# Patient Record
Sex: FEMALE | Race: WHITE | Employment: OTHER | URBAN - METROPOLITAN AREA
[De-identification: names, ages, dates, MRNs, and addresses within clinical notes are randomized per-mention and may not be internally consistent; named-entity substitution may affect disease eponyms.]

---

## 2017-01-01 ENCOUNTER — TELEPHONE (OUTPATIENT)
Dept: INTERNAL MEDICINE CLINIC | Facility: CLINIC | Age: 82
End: 2017-01-01

## 2017-01-01 ENCOUNTER — HOSPITAL ENCOUNTER (OUTPATIENT)
Dept: BONE DENSITY | Facility: HOSPITAL | Age: 82
Discharge: HOME | End: 2017-01-01
Attending: INTERNAL MEDICINE
Payer: MEDICARE

## 2017-01-01 ENCOUNTER — NURSE ONLY (OUTPATIENT)
Dept: INTERNAL MEDICINE CLINIC | Facility: CLINIC | Age: 82
End: 2017-01-01

## 2017-01-01 ENCOUNTER — OFFICE VISIT (OUTPATIENT)
Dept: INTERNAL MEDICINE CLINIC | Facility: CLINIC | Age: 82
End: 2017-01-01

## 2017-01-01 VITALS
RESPIRATION RATE: 17 BRPM | SYSTOLIC BLOOD PRESSURE: 130 MMHG | WEIGHT: 169 LBS | TEMPERATURE: 98 F | HEART RATE: 69 BPM | OXYGEN SATURATION: 99 % | HEIGHT: 65 IN | DIASTOLIC BLOOD PRESSURE: 78 MMHG | BODY MASS INDEX: 28.16 KG/M2

## 2017-01-01 VITALS
RESPIRATION RATE: 17 BRPM | OXYGEN SATURATION: 100 % | BODY MASS INDEX: 28.82 KG/M2 | WEIGHT: 173 LBS | HEART RATE: 68 BPM | HEIGHT: 65 IN | DIASTOLIC BLOOD PRESSURE: 78 MMHG | SYSTOLIC BLOOD PRESSURE: 132 MMHG | TEMPERATURE: 98 F

## 2017-01-01 DIAGNOSIS — E03.9 ACQUIRED HYPOTHYROIDISM: ICD-10-CM

## 2017-01-01 DIAGNOSIS — R73.03 PREDIABETES: ICD-10-CM

## 2017-01-01 DIAGNOSIS — N18.30 CKD (CHRONIC KIDNEY DISEASE) STAGE 3, GFR 30-59 ML/MIN (HCC): ICD-10-CM

## 2017-01-01 DIAGNOSIS — R10.12 ACUTE LUQ PAIN: Primary | ICD-10-CM

## 2017-01-01 DIAGNOSIS — R10.12 ACUTE LUQ PAIN: ICD-10-CM

## 2017-01-01 DIAGNOSIS — Z78.0 MENOPAUSE: ICD-10-CM

## 2017-01-01 DIAGNOSIS — H40.9 GLAUCOMA, UNSPECIFIED GLAUCOMA TYPE, UNSPECIFIED LATERALITY: Primary | ICD-10-CM

## 2017-01-01 DIAGNOSIS — N30.01 ACUTE CYSTITIS WITH HEMATURIA: ICD-10-CM

## 2017-01-01 DIAGNOSIS — Z23 NEED FOR VACCINATION FOR STREP PNEUMONIAE: ICD-10-CM

## 2017-01-01 DIAGNOSIS — R73.03 PRE-DIABETES: Primary | ICD-10-CM

## 2017-01-01 DIAGNOSIS — H40.9 UNSPECIFIED GLAUCOMA(365.9): Primary | ICD-10-CM

## 2017-01-01 DIAGNOSIS — E03.9 ACQUIRED HYPOTHYROIDISM: Primary | ICD-10-CM

## 2017-01-01 DIAGNOSIS — H40.9 GLAUCOMA, UNSPECIFIED GLAUCOMA TYPE, UNSPECIFIED LATERALITY: ICD-10-CM

## 2017-01-01 LAB
ALBUMIN SERPL BCP-MCNC: 4 G/DL (ref 3.5–4.8)
ALBUMIN/GLOB SERPL: 1.7 {RATIO} (ref 1–2)
ALP SERPL-CCNC: 45 U/L (ref 32–100)
ALT SERPL-CCNC: 16 U/L (ref 14–54)
ANION GAP SERPL CALC-SCNC: 7 MMOL/L (ref 0–18)
AST SERPL-CCNC: 18 U/L (ref 15–41)
BASOPHILS # BLD: 0 K/UL (ref 0–0.2)
BASOPHILS NFR BLD: 1 %
BILIRUB SERPL-MCNC: 0.5 MG/DL (ref 0.3–1.2)
BUN SERPL-MCNC: 19 MG/DL (ref 8–20)
BUN/CREAT SERPL: 17.9 (ref 10–20)
CALCIUM SERPL-MCNC: 9 MG/DL (ref 8.5–10.5)
CHLORIDE SERPL-SCNC: 107 MMOL/L (ref 95–110)
CHOLEST SERPL-MCNC: 189 MG/DL (ref 110–200)
CO2 SERPL-SCNC: 25 MMOL/L (ref 22–32)
CREAT SERPL-MCNC: 1.06 MG/DL (ref 0.5–1.5)
EOSINOPHIL # BLD: 0.1 K/UL (ref 0–0.7)
EOSINOPHIL NFR BLD: 2 %
ERYTHROCYTE [DISTWIDTH] IN BLOOD BY AUTOMATED COUNT: 15.2 % (ref 11–15)
GLOBULIN PLAS-MCNC: 2.4 G/DL (ref 2.5–3.7)
GLUCOSE SERPL-MCNC: 78 MG/DL (ref 70–99)
HBA1C MFR BLD: 5.6 % (ref 4–6)
HCT VFR BLD AUTO: 37.7 % (ref 35–48)
HDLC SERPL-MCNC: 59 MG/DL
HGB BLD-MCNC: 12.4 G/DL (ref 12–16)
LDLC SERPL CALC-MCNC: 109 MG/DL (ref 0–99)
LYMPHOCYTES # BLD: 1.3 K/UL (ref 1–4)
LYMPHOCYTES NFR BLD: 34 %
MCH RBC QN AUTO: 31.9 PG (ref 27–32)
MCHC RBC AUTO-ENTMCNC: 32.9 G/DL (ref 32–37)
MCV RBC AUTO: 97 FL (ref 80–100)
MONOCYTES # BLD: 0.8 K/UL (ref 0–1)
MONOCYTES NFR BLD: 20 %
NEUTROPHILS # BLD AUTO: 1.6 K/UL (ref 1.8–7.7)
NEUTROPHILS NFR BLD: 43 %
NONHDLC SERPL-MCNC: 130 MG/DL
OSMOLALITY UR CALC.SUM OF ELEC: 289 MOSM/KG (ref 275–295)
PLATELET # BLD AUTO: 218 K/UL (ref 140–400)
PMV BLD AUTO: 9.4 FL (ref 7.4–10.3)
POTASSIUM SERPL-SCNC: 4 MMOL/L (ref 3.3–5.1)
PROT SERPL-MCNC: 6.4 G/DL (ref 5.9–8.4)
RBC # BLD AUTO: 3.88 M/UL (ref 3.7–5.4)
SODIUM SERPL-SCNC: 139 MMOL/L (ref 136–144)
TRIGL SERPL-MCNC: 103 MG/DL (ref 1–149)
TSH SERPL-ACNC: 2.05 UIU/ML (ref 0.45–5.33)
WBC # BLD AUTO: 3.8 K/UL (ref 4–11)

## 2017-01-01 PROCEDURE — 99204 OFFICE O/P NEW MOD 45 MIN: CPT | Performed by: INTERNAL MEDICINE

## 2017-01-01 PROCEDURE — G0009 ADMIN PNEUMOCOCCAL VACCINE: HCPCS | Performed by: INTERNAL MEDICINE

## 2017-01-01 PROCEDURE — 99213 OFFICE O/P EST LOW 20 MIN: CPT | Performed by: INTERNAL MEDICINE

## 2017-01-01 PROCEDURE — 84443 ASSAY THYROID STIM HORMONE: CPT | Performed by: INTERNAL MEDICINE

## 2017-01-01 PROCEDURE — 85025 COMPLETE CBC W/AUTO DIFF WBC: CPT | Performed by: INTERNAL MEDICINE

## 2017-01-01 PROCEDURE — 80053 COMPREHEN METABOLIC PANEL: CPT | Performed by: INTERNAL MEDICINE

## 2017-01-01 PROCEDURE — 90670 PCV13 VACCINE IM: CPT | Performed by: INTERNAL MEDICINE

## 2017-01-01 PROCEDURE — 83036 HEMOGLOBIN GLYCOSYLATED A1C: CPT | Performed by: INTERNAL MEDICINE

## 2017-01-01 PROCEDURE — 80061 LIPID PANEL: CPT | Performed by: INTERNAL MEDICINE

## 2017-01-01 PROCEDURE — 77080 DXA BONE DENSITY AXIAL: CPT | Performed by: INTERNAL MEDICINE

## 2017-01-01 PROCEDURE — 36415 COLL VENOUS BLD VENIPUNCTURE: CPT | Performed by: INTERNAL MEDICINE

## 2017-01-01 RX ORDER — DORZOLAMIDE HCL 20 MG/ML
SOLUTION/ DROPS OPHTHALMIC
COMMUNITY
Start: 2017-01-01 | End: 2017-01-01 | Stop reason: ALTCHOICE

## 2017-01-01 RX ORDER — LEVOTHYROXINE SODIUM 88 UG/1
88 TABLET ORAL
Qty: 90 TABLET | Refills: 3 | Status: ON HOLD | OUTPATIENT
Start: 2017-01-01 | End: 2018-01-01

## 2017-01-01 RX ORDER — LATANOPROST 50 UG/ML
1 SOLUTION/ DROPS OPHTHALMIC NIGHTLY
COMMUNITY
Start: 2017-01-01

## 2017-01-01 RX ORDER — LEVOTHYROXINE SODIUM 88 UG/1
TABLET ORAL
COMMUNITY
Start: 2017-01-01 | End: 2017-01-01

## 2017-01-01 RX ORDER — CIPROFLOXACIN 250 MG/1
250 TABLET, FILM COATED ORAL 2 TIMES DAILY
Qty: 14 TABLET | Refills: 0 | Status: SHIPPED | OUTPATIENT
Start: 2017-01-01 | End: 2017-01-01

## 2017-08-09 NOTE — PROGRESS NOTES
HPI:    Patient ID: Sharad Huang is a 80year old female. HPI   Patient is here to establish care. She had transferred to [place] from [place]. Will spend few mos here and back to [place] during winter. Retired dressmaker.  Known to have hypothy Constitutional: She is oriented to person, place, and time. She appears well-developed and well-nourished. No distress. HENT:   Head: Normocephalic.    Right Ear: External ear normal.   Left Ear: External ear normal.   Nose: Nose normal.   Mouth/Throat: O Obtain record of Bone dexa. Vitamin d  normal.     Fall precaution.     Orders Placed This Encounter      CBC W Differential W Platelet [E]      Lipid Panel [E]      Comp Metabolic Panel (14) [E]      TSH W Reflex To Free T4 [E]      HEMOGLOBIN A1C      Pn ? Wear shoes both inside and outside the house. ? Keep emergency numbers near each phone. ? Consider wearing an alarm device that will bring help in case you fall and can’t get up.

## 2017-08-10 PROBLEM — E03.9 ACQUIRED HYPOTHYROIDISM: Status: ACTIVE | Noted: 2017-01-01

## 2017-08-10 PROBLEM — H40.9 GLAUCOMA: Status: ACTIVE | Noted: 2017-01-01

## 2017-08-10 PROBLEM — E66.3 OVERWEIGHT (BMI 25.0-29.9): Status: ACTIVE | Noted: 2017-01-01

## 2017-08-10 PROBLEM — N18.30 CKD (CHRONIC KIDNEY DISEASE) STAGE 3, GFR 30-59 ML/MIN (HCC): Status: ACTIVE | Noted: 2017-01-01

## 2017-08-22 NOTE — PROGRESS NOTES
Patient presented in office today for fasting blood draw. Blood draw successful in left arm  King:  Cbc,cmp,lipid,tsh,A1C  D. O.B verified   Orders per Doctor Co

## 2017-09-05 NOTE — PROGRESS NOTES
Vaccination administered in right arm IM  Patient tolerated well no reaction at this time, no blood at injection site band aid applied.   Vaccine given:  PCV-13  Orders per Doctor Co

## 2017-10-13 NOTE — TELEPHONE ENCOUNTER
Pt request a referral to Dr. Roberto Carlos Stanford for follow up. Pt has appt on Monday 9/16.  Request 3 visitas

## 2017-11-07 NOTE — PROGRESS NOTES
HPI:    Patient ID: Uriel Crowder is a 80year old female. HPI   C/o LUQ pain for last 2 days. Pain is resolving. Recalled trying britney reach out felt she probably strained her muscle. Denies nausea, vomiting. BM normal. Appetite is good.  No cough , s and breath sounds normal.   Abdominal: Soft. Bowel sounds are normal. She exhibits no distension and no mass. There is no rebound and no guarding. Mild LUQ tenderness   Musculoskeletal: She exhibits no edema.    Neurological: She is alert and oriented to

## 2018-01-01 ENCOUNTER — ANESTHESIA (OUTPATIENT)
Dept: ENDOSCOPY | Facility: HOSPITAL | Age: 83
DRG: 375 | End: 2018-01-01
Payer: MEDICARE

## 2018-01-01 ENCOUNTER — OFFICE VISIT (OUTPATIENT)
Dept: INTERNAL MEDICINE CLINIC | Facility: CLINIC | Age: 83
End: 2018-01-01

## 2018-01-01 ENCOUNTER — TELEPHONE (OUTPATIENT)
Dept: HEMATOLOGY/ONCOLOGY | Facility: HOSPITAL | Age: 83
End: 2018-01-01

## 2018-01-01 ENCOUNTER — APPOINTMENT (OUTPATIENT)
Dept: HEMATOLOGY/ONCOLOGY | Facility: HOSPITAL | Age: 83
End: 2018-01-01
Attending: INTERNAL MEDICINE
Payer: MEDICARE

## 2018-01-01 ENCOUNTER — NURSE NAVIGATOR ENCOUNTER (OUTPATIENT)
Dept: HEMATOLOGY/ONCOLOGY | Facility: HOSPITAL | Age: 83
End: 2018-01-01

## 2018-01-01 ENCOUNTER — TELEPHONE (OUTPATIENT)
Dept: INTERNAL MEDICINE CLINIC | Facility: CLINIC | Age: 83
End: 2018-01-01

## 2018-01-01 ENCOUNTER — NURSE ONLY (OUTPATIENT)
Dept: INTERNAL MEDICINE CLINIC | Facility: CLINIC | Age: 83
End: 2018-01-01

## 2018-01-01 ENCOUNTER — HOSPITAL ENCOUNTER (OUTPATIENT)
Dept: CT IMAGING | Facility: HOSPITAL | Age: 83
Discharge: HOME | End: 2018-01-01
Attending: INTERNAL MEDICINE
Payer: MEDICARE

## 2018-01-01 ENCOUNTER — TELEPHONE (OUTPATIENT)
Dept: GASTROENTEROLOGY | Facility: CLINIC | Age: 83
End: 2018-01-01

## 2018-01-01 ENCOUNTER — APPOINTMENT (OUTPATIENT)
Dept: INTERVENTIONAL RADIOLOGY/VASCULAR | Facility: HOSPITAL | Age: 83
DRG: 375 | End: 2018-01-01
Attending: INTERNAL MEDICINE
Payer: MEDICARE

## 2018-01-01 ENCOUNTER — SURGERY (OUTPATIENT)
Age: 83
End: 2018-01-01

## 2018-01-01 ENCOUNTER — ANESTHESIA EVENT (OUTPATIENT)
Dept: ENDOSCOPY | Facility: HOSPITAL | Age: 83
DRG: 375 | End: 2018-01-01
Payer: MEDICARE

## 2018-01-01 ENCOUNTER — HOSPITAL ENCOUNTER (INPATIENT)
Facility: HOSPITAL | Age: 83
LOS: 7 days | Discharge: HOSPICE | DRG: 375 | End: 2018-01-01
Attending: EMERGENCY MEDICINE | Admitting: INTERNAL MEDICINE
Payer: MEDICARE

## 2018-01-01 ENCOUNTER — OFFICE VISIT (OUTPATIENT)
Dept: GASTROENTEROLOGY | Facility: CLINIC | Age: 83
End: 2018-01-01

## 2018-01-01 ENCOUNTER — APPOINTMENT (OUTPATIENT)
Dept: CT IMAGING | Facility: HOSPITAL | Age: 83
DRG: 375 | End: 2018-01-01
Attending: EMERGENCY MEDICINE
Payer: MEDICARE

## 2018-01-01 ENCOUNTER — APPOINTMENT (OUTPATIENT)
Dept: GENERAL RADIOLOGY | Facility: HOSPITAL | Age: 83
DRG: 375 | End: 2018-01-01
Attending: INTERNAL MEDICINE
Payer: MEDICARE

## 2018-01-01 ENCOUNTER — HOSPITAL ENCOUNTER (OUTPATIENT)
Facility: HOSPITAL | Age: 83
Setting detail: HOSPITAL OUTPATIENT SURGERY
Discharge: HOME | DRG: 375 | End: 2018-01-01
Attending: INTERNAL MEDICINE | Admitting: INTERNAL MEDICINE
Payer: MEDICARE

## 2018-01-01 VITALS
HEIGHT: 65 IN | SYSTOLIC BLOOD PRESSURE: 127 MMHG | RESPIRATION RATE: 19 BRPM | DIASTOLIC BLOOD PRESSURE: 63 MMHG | BODY MASS INDEX: 28.32 KG/M2 | WEIGHT: 170 LBS | HEART RATE: 79 BPM | OXYGEN SATURATION: 97 %

## 2018-01-01 VITALS
SYSTOLIC BLOOD PRESSURE: 118 MMHG | RESPIRATION RATE: 17 BRPM | OXYGEN SATURATION: 98 % | TEMPERATURE: 98 F | HEART RATE: 78 BPM | HEIGHT: 65 IN | BODY MASS INDEX: 28.49 KG/M2 | DIASTOLIC BLOOD PRESSURE: 62 MMHG | WEIGHT: 171 LBS

## 2018-01-01 VITALS
TEMPERATURE: 98 F | BODY MASS INDEX: 29.16 KG/M2 | HEART RATE: 70 BPM | SYSTOLIC BLOOD PRESSURE: 134 MMHG | OXYGEN SATURATION: 98 % | WEIGHT: 175 LBS | HEIGHT: 65 IN | RESPIRATION RATE: 20 BRPM | DIASTOLIC BLOOD PRESSURE: 60 MMHG

## 2018-01-01 VITALS
WEIGHT: 163.81 LBS | BODY MASS INDEX: 26.33 KG/M2 | DIASTOLIC BLOOD PRESSURE: 73 MMHG | TEMPERATURE: 98 F | OXYGEN SATURATION: 93 % | RESPIRATION RATE: 18 BRPM | HEART RATE: 72 BPM | HEIGHT: 66 IN | SYSTOLIC BLOOD PRESSURE: 131 MMHG

## 2018-01-01 VITALS
SYSTOLIC BLOOD PRESSURE: 114 MMHG | BODY MASS INDEX: 28.29 KG/M2 | WEIGHT: 169.81 LBS | DIASTOLIC BLOOD PRESSURE: 68 MMHG | HEIGHT: 65 IN | HEART RATE: 83 BPM

## 2018-01-01 DIAGNOSIS — R93.89 ABNORMAL CT SCAN: ICD-10-CM

## 2018-01-01 DIAGNOSIS — D50.9 IRON DEFICIENCY ANEMIA, UNSPECIFIED IRON DEFICIENCY ANEMIA TYPE: ICD-10-CM

## 2018-01-01 DIAGNOSIS — R10.9 ABDOMINAL PAIN, ACUTE: Primary | ICD-10-CM

## 2018-01-01 DIAGNOSIS — K57.20 DIVERTICULITIS OF LARGE INTESTINE WITH ABSCESS WITHOUT BLEEDING: Primary | ICD-10-CM

## 2018-01-01 DIAGNOSIS — R19.00 PELVIC MASS IN FEMALE: ICD-10-CM

## 2018-01-01 DIAGNOSIS — D50.8 OTHER IRON DEFICIENCY ANEMIA: ICD-10-CM

## 2018-01-01 DIAGNOSIS — K57.20 DIVERTICULITIS OF LARGE INTESTINE WITH ABSCESS WITHOUT BLEEDING: ICD-10-CM

## 2018-01-01 DIAGNOSIS — K57.32 DIVERTICULITIS OF SIGMOID COLON: Primary | ICD-10-CM

## 2018-01-01 DIAGNOSIS — K57.32 DIVERTICULITIS OF SIGMOID COLON: ICD-10-CM

## 2018-01-01 DIAGNOSIS — R18.0 ASCITES, MALIGNANT: ICD-10-CM

## 2018-01-01 DIAGNOSIS — K57.32 SIGMOID DIVERTICULITIS: ICD-10-CM

## 2018-01-01 DIAGNOSIS — C79.9 METASTATIC CARCINOMA (HCC): ICD-10-CM

## 2018-01-01 DIAGNOSIS — C79.9 METASTATIC CANCER (HCC): Primary | ICD-10-CM

## 2018-01-01 DIAGNOSIS — R19.00 PELVIC MASS: ICD-10-CM

## 2018-01-01 DIAGNOSIS — C79.9 METASTATIC CANCER (HCC): ICD-10-CM

## 2018-01-01 DIAGNOSIS — M54.50 ACUTE RIGHT-SIDED LOW BACK PAIN WITHOUT SCIATICA: ICD-10-CM

## 2018-01-01 DIAGNOSIS — N83.202 CYST OF LEFT OVARY: ICD-10-CM

## 2018-01-01 DIAGNOSIS — E03.9 ACQUIRED HYPOTHYROIDISM: ICD-10-CM

## 2018-01-01 LAB
ALBUMIN SERPL BCP-MCNC: 3 G/DL (ref 3.5–4.8)
ALBUMIN/GLOB SERPL: 0.9 {RATIO} (ref 1–2)
ALP SERPL-CCNC: 46 U/L (ref 32–100)
ALT SERPL-CCNC: 15 U/L (ref 14–54)
ANION GAP SERPL CALC-SCNC: 10 MMOL/L (ref 0–18)
AST SERPL-CCNC: 18 U/L (ref 15–41)
BASOPHILS # BLD: 0 K/UL (ref 0–0.2)
BASOPHILS NFR BLD: 1 %
BILIRUB SERPL-MCNC: 0.3 MG/DL (ref 0.3–1.2)
BUN SERPL-MCNC: 7 MG/DL (ref 8–20)
BUN/CREAT SERPL: 7.1 (ref 10–20)
CALCIUM SERPL-MCNC: 9.1 MG/DL (ref 8.5–10.5)
CANCER AG125 SERPL-ACNC: 33 U/ML (ref 0–35)
CEA SERPL-MCNC: 5 NG/ML (ref 0–5)
CHLORIDE SERPL-SCNC: 105 MMOL/L (ref 95–110)
CO2 SERPL-SCNC: 24 MMOL/L (ref 22–32)
CREAT SERPL-MCNC: 0.98 MG/DL (ref 0.5–1.5)
EOSINOPHIL # BLD: 0 K/UL (ref 0–0.7)
EOSINOPHIL NFR BLD: 1 %
ERYTHROCYTE [DISTWIDTH] IN BLOOD BY AUTOMATED COUNT: 14.8 % (ref 11–15)
ERYTHROCYTE [SEDIMENTATION RATE] IN BLOOD: 43 MM/HR (ref 0–30)
FERRITIN SERPL IA-MCNC: 134 NG/ML (ref 11–307)
GLOBULIN PLAS-MCNC: 3.4 G/DL (ref 2.5–3.7)
GLUCOSE SERPL-MCNC: 72 MG/DL (ref 70–99)
HCT VFR BLD AUTO: 31.5 % (ref 35–48)
HEMOCCULT STL QL: NEGATIVE
HGB BLD-MCNC: 10.4 G/DL (ref 12–16)
IRON SATN MFR SERPL: 17 % (ref 15–50)
IRON SERPL-MCNC: 32 MCG/DL (ref 28–170)
LYMPHOCYTES # BLD: 1.2 K/UL (ref 1–4)
LYMPHOCYTES NFR BLD: 19 %
MCH RBC QN AUTO: 31 PG (ref 27–32)
MCHC RBC AUTO-ENTMCNC: 33.2 G/DL (ref 32–37)
MCV RBC AUTO: 93.5 FL (ref 80–100)
MONOCYTES # BLD: 1.5 K/UL (ref 0–1)
MONOCYTES NFR BLD: 24 %
NEUTROPHILS # BLD AUTO: 3.5 K/UL (ref 1.8–7.7)
NEUTROPHILS NFR BLD: 56 %
OSMOLALITY UR CALC.SUM OF ELEC: 285 MOSM/KG (ref 275–295)
PATIENT FASTING: YES
PLATELET # BLD AUTO: 382 K/UL (ref 140–400)
PMV BLD AUTO: 8.6 FL (ref 7.4–10.3)
POTASSIUM SERPL-SCNC: 4.2 MMOL/L (ref 3.3–5.1)
PROT SERPL-MCNC: 6.4 G/DL (ref 5.9–8.4)
RBC # BLD AUTO: 3.37 M/UL (ref 3.7–5.4)
SODIUM SERPL-SCNC: 139 MMOL/L (ref 136–144)
TIBC SERPL-MCNC: 193 MCG/DL (ref 228–428)
TRANSFERRIN SERPL-MCNC: 146 MG/DL (ref 192–382)
WBC # BLD AUTO: 6.3 K/UL (ref 4–11)

## 2018-01-01 PROCEDURE — 74018 RADEX ABDOMEN 1 VIEW: CPT | Performed by: INTERNAL MEDICINE

## 2018-01-01 PROCEDURE — 0DJD8ZZ INSPECTION OF LOWER INTESTINAL TRACT, VIA NATURAL OR ARTIFICIAL OPENING ENDOSCOPIC: ICD-10-PCS | Performed by: INTERNAL MEDICINE

## 2018-01-01 PROCEDURE — 99233 SBSQ HOSP IP/OBS HIGH 50: CPT | Performed by: INTERNAL MEDICINE

## 2018-01-01 PROCEDURE — 99214 OFFICE O/P EST MOD 30 MIN: CPT | Performed by: INTERNAL MEDICINE

## 2018-01-01 PROCEDURE — 0DH63UZ INSERTION OF FEEDING DEVICE INTO STOMACH, PERCUTANEOUS APPROACH: ICD-10-PCS | Performed by: INTERNAL MEDICINE

## 2018-01-01 PROCEDURE — 99222 1ST HOSP IP/OBS MODERATE 55: CPT | Performed by: INTERNAL MEDICINE

## 2018-01-01 PROCEDURE — 99232 SBSQ HOSP IP/OBS MODERATE 35: CPT | Performed by: INTERNAL MEDICINE

## 2018-01-01 PROCEDURE — 0DBW3ZX EXCISION OF PERITONEUM, PERCUTANEOUS APPROACH, DIAGNOSTIC: ICD-10-PCS | Performed by: RADIOLOGY

## 2018-01-01 PROCEDURE — 82728 ASSAY OF FERRITIN: CPT | Performed by: INTERNAL MEDICINE

## 2018-01-01 PROCEDURE — G0463 HOSPITAL OUTPT CLINIC VISIT: HCPCS | Performed by: INTERNAL MEDICINE

## 2018-01-01 PROCEDURE — 85652 RBC SED RATE AUTOMATED: CPT | Performed by: INTERNAL MEDICINE

## 2018-01-01 PROCEDURE — 99231 SBSQ HOSP IP/OBS SF/LOW 25: CPT | Performed by: INTERNAL MEDICINE

## 2018-01-01 PROCEDURE — 83540 ASSAY OF IRON: CPT | Performed by: INTERNAL MEDICINE

## 2018-01-01 PROCEDURE — 85025 COMPLETE CBC W/AUTO DIFF WBC: CPT | Performed by: INTERNAL MEDICINE

## 2018-01-01 PROCEDURE — 86304 IMMUNOASSAY TUMOR CA 125: CPT | Performed by: INTERNAL MEDICINE

## 2018-01-01 PROCEDURE — 99223 1ST HOSP IP/OBS HIGH 75: CPT | Performed by: INTERNAL MEDICINE

## 2018-01-01 PROCEDURE — 74177 CT ABD & PELVIS W/CONTRAST: CPT | Performed by: EMERGENCY MEDICINE

## 2018-01-01 PROCEDURE — 43246 EGD PLACE GASTROSTOMY TUBE: CPT | Performed by: INTERNAL MEDICINE

## 2018-01-01 PROCEDURE — 99204 OFFICE O/P NEW MOD 45 MIN: CPT | Performed by: INTERNAL MEDICINE

## 2018-01-01 PROCEDURE — 84466 ASSAY OF TRANSFERRIN: CPT | Performed by: INTERNAL MEDICINE

## 2018-01-01 PROCEDURE — 71260 CT THORAX DX C+: CPT | Performed by: INTERNAL MEDICINE

## 2018-01-01 PROCEDURE — 80053 COMPREHEN METABOLIC PANEL: CPT | Performed by: INTERNAL MEDICINE

## 2018-01-01 PROCEDURE — 0DJ08ZZ INSPECTION OF UPPER INTESTINAL TRACT, VIA NATURAL OR ARTIFICIAL OPENING ENDOSCOPIC: ICD-10-PCS | Performed by: INTERNAL MEDICINE

## 2018-01-01 PROCEDURE — 74177 CT ABD & PELVIS W/CONTRAST: CPT | Performed by: INTERNAL MEDICINE

## 2018-01-01 PROCEDURE — 43235 EGD DIAGNOSTIC BRUSH WASH: CPT | Performed by: INTERNAL MEDICINE

## 2018-01-01 PROCEDURE — 82378 CARCINOEMBRYONIC ANTIGEN: CPT | Performed by: INTERNAL MEDICINE

## 2018-01-01 PROCEDURE — 82565 ASSAY OF CREATININE: CPT

## 2018-01-01 PROCEDURE — 99239 HOSP IP/OBS DSCHRG MGMT >30: CPT | Performed by: INTERNAL MEDICINE

## 2018-01-01 PROCEDURE — 82274 ASSAY TEST FOR BLOOD FECAL: CPT | Performed by: INTERNAL MEDICINE

## 2018-01-01 PROCEDURE — 45378 DIAGNOSTIC COLONOSCOPY: CPT | Performed by: INTERNAL MEDICINE

## 2018-01-01 DEVICE — SAFETY PEG KIT 20FR: Type: IMPLANTABLE DEVICE | Site: ABDOMEN | Status: FUNCTIONAL

## 2018-01-01 RX ORDER — LEVOTHYROXINE SODIUM 88 UG/1
88 TABLET ORAL
Status: DISCONTINUED | OUTPATIENT
Start: 2018-01-01 | End: 2018-01-01

## 2018-01-01 RX ORDER — POTASSIUM CHLORIDE 20 MEQ/1
40 TABLET, EXTENDED RELEASE ORAL DAILY
Status: COMPLETED | OUTPATIENT
Start: 2018-01-01 | End: 2018-01-01

## 2018-01-01 RX ORDER — LEVOFLOXACIN 500 MG/1
TABLET, FILM COATED ORAL
COMMUNITY
Start: 2018-01-01 | End: 2018-01-01 | Stop reason: ALTCHOICE

## 2018-01-01 RX ORDER — ARIPIPRAZOLE 15 MG/1
40 TABLET ORAL EVERY 4 HOURS
Status: DISCONTINUED | OUTPATIENT
Start: 2018-01-01 | End: 2018-01-01

## 2018-01-01 RX ORDER — SODIUM CHLORIDE, SODIUM LACTATE, POTASSIUM CHLORIDE, CALCIUM CHLORIDE 600; 310; 30; 20 MG/100ML; MG/100ML; MG/100ML; MG/100ML
INJECTION, SOLUTION INTRAVENOUS CONTINUOUS PRN
Status: DISCONTINUED | OUTPATIENT
Start: 2018-01-01 | End: 2018-01-01 | Stop reason: SURG

## 2018-01-01 RX ORDER — LEVOTHYROXINE SODIUM 0.1 MG/1
100 TABLET ORAL DAILY
Qty: 90 TABLET | Refills: 0 | Status: SHIPPED | OUTPATIENT
Start: 2018-01-01 | End: 2019-04-14

## 2018-01-01 RX ORDER — LATANOPROST 50 UG/ML
1 SOLUTION/ DROPS OPHTHALMIC NIGHTLY
Status: DISCONTINUED | OUTPATIENT
Start: 2018-01-01 | End: 2018-01-01

## 2018-01-01 RX ORDER — LIDOCAINE HYDROCHLORIDE 20 MG/ML
INJECTION, SOLUTION EPIDURAL; INFILTRATION; INTRACAUDAL; PERINEURAL
Status: COMPLETED
Start: 2018-01-01 | End: 2018-01-01

## 2018-01-01 RX ORDER — SODIUM CHLORIDE, SODIUM LACTATE, POTASSIUM CHLORIDE, CALCIUM CHLORIDE 600; 310; 30; 20 MG/100ML; MG/100ML; MG/100ML; MG/100ML
INJECTION, SOLUTION INTRAVENOUS CONTINUOUS
Status: DISCONTINUED | OUTPATIENT
Start: 2018-01-01 | End: 2018-01-01

## 2018-01-01 RX ORDER — ENOXAPARIN SODIUM 100 MG/ML
40 INJECTION SUBCUTANEOUS DAILY
Status: DISCONTINUED | OUTPATIENT
Start: 2018-01-01 | End: 2018-01-01

## 2018-01-01 RX ORDER — NALOXONE HYDROCHLORIDE 0.4 MG/ML
80 INJECTION, SOLUTION INTRAMUSCULAR; INTRAVENOUS; SUBCUTANEOUS AS NEEDED
Status: DISCONTINUED | OUTPATIENT
Start: 2018-01-01 | End: 2018-01-01 | Stop reason: HOSPADM

## 2018-01-01 RX ORDER — HYDROMORPHONE HYDROCHLORIDE 1 MG/ML
0.5 INJECTION, SOLUTION INTRAMUSCULAR; INTRAVENOUS; SUBCUTANEOUS ONCE
Status: COMPLETED | OUTPATIENT
Start: 2018-01-01 | End: 2018-01-01

## 2018-01-01 RX ORDER — DEXAMETHASONE 4 MG/1
4 TABLET ORAL EVERY 12 HOURS SCHEDULED
Status: DISCONTINUED | OUTPATIENT
Start: 2018-01-01 | End: 2018-01-01

## 2018-01-01 RX ORDER — HYDROXYZINE HYDROCHLORIDE 10 MG/1
10 TABLET, FILM COATED ORAL NIGHTLY PRN
Status: DISCONTINUED | OUTPATIENT
Start: 2018-01-01 | End: 2018-01-01

## 2018-01-01 RX ORDER — LEVOTHYROXINE SODIUM 0.1 MG/1
100 TABLET ORAL
Status: DISCONTINUED | OUTPATIENT
Start: 2018-01-01 | End: 2018-01-01

## 2018-01-01 RX ORDER — ONDANSETRON 4 MG/1
TABLET, FILM COATED ORAL
Status: ON HOLD | COMMUNITY
Start: 2018-01-01 | End: 2018-01-01

## 2018-01-01 RX ORDER — FERROUS SULFATE 325(65) MG
325 TABLET ORAL
Status: ON HOLD | COMMUNITY
Start: 2018-01-01 | End: 2018-01-01

## 2018-01-01 RX ORDER — HYDROMORPHONE HYDROCHLORIDE 1 MG/ML
0.5 INJECTION, SOLUTION INTRAMUSCULAR; INTRAVENOUS; SUBCUTANEOUS EVERY 6 HOURS PRN
Status: DISCONTINUED | OUTPATIENT
Start: 2018-01-01 | End: 2018-01-01

## 2018-01-01 RX ORDER — OXYCODONE HCL 10 MG/1
10 TABLET, FILM COATED, EXTENDED RELEASE ORAL DAILY
Status: DISCONTINUED | OUTPATIENT
Start: 2018-01-01 | End: 2018-01-01

## 2018-01-01 RX ORDER — POLYETHYLENE GLYCOL 3350 17 G/17G
17 POWDER, FOR SOLUTION ORAL DAILY
Status: DISCONTINUED | OUTPATIENT
Start: 2018-01-01 | End: 2018-01-01

## 2018-01-01 RX ORDER — SODIUM CHLORIDE 9 MG/ML
INJECTION, SOLUTION INTRAVENOUS ONCE
Status: COMPLETED | OUTPATIENT
Start: 2018-01-01 | End: 2018-01-01

## 2018-01-01 RX ORDER — 0.9 % SODIUM CHLORIDE 0.9 %
VIAL (ML) INJECTION
Status: DISPENSED
Start: 2018-01-01 | End: 2018-01-01

## 2018-01-01 RX ORDER — LIDOCAINE HYDROCHLORIDE 10 MG/ML
INJECTION, SOLUTION EPIDURAL; INFILTRATION; INTRACAUDAL; PERINEURAL AS NEEDED
Status: DISCONTINUED | OUTPATIENT
Start: 2018-01-01 | End: 2018-01-01 | Stop reason: SURG

## 2018-01-01 RX ORDER — ONDANSETRON 2 MG/ML
4 INJECTION INTRAMUSCULAR; INTRAVENOUS EVERY 6 HOURS PRN
Status: DISCONTINUED | OUTPATIENT
Start: 2018-01-01 | End: 2018-01-01

## 2018-01-01 RX ORDER — 0.9 % SODIUM CHLORIDE 0.9 %
VIAL (ML) INJECTION
Status: COMPLETED
Start: 2018-01-01 | End: 2018-01-01

## 2018-01-01 RX ORDER — HYDROCODONE BITARTRATE AND ACETAMINOPHEN 5; 325 MG/1; MG/1
1 TABLET ORAL EVERY 6 HOURS PRN
Qty: 30 TABLET | Refills: 0 | Status: CANCELLED | OUTPATIENT
Start: 2018-01-01

## 2018-01-01 RX ORDER — SODIUM CHLORIDE 9 MG/ML
INJECTION, SOLUTION INTRAVENOUS
Status: COMPLETED
Start: 2018-01-01 | End: 2018-01-01

## 2018-01-01 RX ORDER — HYDROMORPHONE HYDROCHLORIDE 1 MG/ML
0.5 INJECTION, SOLUTION INTRAMUSCULAR; INTRAVENOUS; SUBCUTANEOUS EVERY 4 HOURS PRN
Status: DISCONTINUED | OUTPATIENT
Start: 2018-01-01 | End: 2018-01-01

## 2018-01-01 RX ORDER — ONDANSETRON 2 MG/ML
INJECTION INTRAMUSCULAR; INTRAVENOUS AS NEEDED
Status: DISCONTINUED | OUTPATIENT
Start: 2018-01-01 | End: 2018-01-01 | Stop reason: SURG

## 2018-01-01 RX ORDER — OXYCODONE HCL 10 MG/1
10 TABLET, FILM COATED, EXTENDED RELEASE ORAL DAILY
Qty: 60 TABLET | Refills: 0 | Status: SHIPPED | OUTPATIENT
Start: 2018-01-01

## 2018-01-01 RX ORDER — LIDOCAINE HYDROCHLORIDE AND EPINEPHRINE 10; 10 MG/ML; UG/ML
INJECTION, SOLUTION INFILTRATION; PERINEURAL
Status: DISCONTINUED | OUTPATIENT
Start: 2018-01-01 | End: 2018-01-01 | Stop reason: HOSPADM

## 2018-01-01 RX ORDER — METRONIDAZOLE 500 MG/1
TABLET ORAL
COMMUNITY
Start: 2018-01-01 | End: 2018-01-01 | Stop reason: ALTCHOICE

## 2018-01-01 RX ORDER — TRAMADOL HYDROCHLORIDE 50 MG/1
TABLET ORAL
Status: ON HOLD | COMMUNITY
Start: 2018-01-01 | End: 2018-01-01

## 2018-01-01 RX ORDER — DEXTROSE MONOHYDRATE 50 MG/ML
INJECTION, SOLUTION INTRAVENOUS CONTINUOUS
Status: DISCONTINUED | OUTPATIENT
Start: 2018-01-01 | End: 2018-01-01

## 2018-01-01 RX ORDER — MIDAZOLAM HYDROCHLORIDE 1 MG/ML
INJECTION INTRAMUSCULAR; INTRAVENOUS
Status: COMPLETED
Start: 2018-01-01 | End: 2018-01-01

## 2018-01-01 RX ORDER — ONDANSETRON 2 MG/ML
4 INJECTION INTRAMUSCULAR; INTRAVENOUS ONCE AS NEEDED
Status: DISCONTINUED | OUTPATIENT
Start: 2018-01-01 | End: 2018-01-01 | Stop reason: HOSPADM

## 2018-01-01 RX ORDER — NALOXONE HYDROCHLORIDE 0.4 MG/ML
80 INJECTION, SOLUTION INTRAMUSCULAR; INTRAVENOUS; SUBCUTANEOUS AS NEEDED
Status: DISCONTINUED | OUTPATIENT
Start: 2018-01-01 | End: 2018-01-01

## 2018-01-01 RX ORDER — DEXTROSE AND SODIUM CHLORIDE 5; .45 G/100ML; G/100ML
INJECTION, SOLUTION INTRAVENOUS CONTINUOUS
Status: DISCONTINUED | OUTPATIENT
Start: 2018-01-01 | End: 2018-01-01

## 2018-01-01 RX ORDER — DEXAMETHASONE 4 MG/1
4 TABLET ORAL EVERY 12 HOURS SCHEDULED
Qty: 40 TABLET | Refills: 0 | Status: SHIPPED | OUTPATIENT
Start: 2018-01-01

## 2018-01-01 RX ADMIN — SODIUM CHLORIDE, SODIUM LACTATE, POTASSIUM CHLORIDE, CALCIUM CHLORIDE: 600; 310; 30; 20 INJECTION, SOLUTION INTRAVENOUS at 09:33:00

## 2018-01-01 RX ADMIN — SODIUM CHLORIDE, SODIUM LACTATE, POTASSIUM CHLORIDE, CALCIUM CHLORIDE: 600; 310; 30; 20 INJECTION, SOLUTION INTRAVENOUS at 10:45:00

## 2018-01-01 RX ADMIN — LIDOCAINE HYDROCHLORIDE 100 MG: 10 INJECTION, SOLUTION EPIDURAL; INFILTRATION; INTRACAUDAL; PERINEURAL at 10:12:00

## 2018-01-01 RX ADMIN — ONDANSETRON 4 MG: 2 INJECTION INTRAMUSCULAR; INTRAVENOUS at 10:18:00

## 2018-01-01 RX ADMIN — LIDOCAINE HYDROCHLORIDE 50 MG: 10 INJECTION, SOLUTION EPIDURAL; INFILTRATION; INTRACAUDAL; PERINEURAL at 09:30:00

## 2018-01-01 RX ADMIN — SODIUM CHLORIDE, SODIUM LACTATE, POTASSIUM CHLORIDE, CALCIUM CHLORIDE: 600; 310; 30; 20 INJECTION, SOLUTION INTRAVENOUS at 10:10:00

## 2018-01-01 RX ADMIN — SODIUM CHLORIDE, SODIUM LACTATE, POTASSIUM CHLORIDE, CALCIUM CHLORIDE: 600; 310; 30; 20 INJECTION, SOLUTION INTRAVENOUS at 10:11:00

## 2018-03-13 NOTE — TELEPHONE ENCOUNTER
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 [facility] will be sending her medical records from [place].

## 2018-03-14 NOTE — TELEPHONE ENCOUNTER
Spoke with pt is currently on [place]. Was diagnosed with Diverticulitis. Pt will complete abx. Also was diagnosed with cancer in stomach. Pt scheduled appt for wed. 3/21 to discuss biopsy results.

## 2018-03-16 NOTE — TELEPHONE ENCOUNTER
Patient is in [address] entered pain may get worse as she tries to get back to [facility]

## 2018-03-20 NOTE — TELEPHONE ENCOUNTER
Talk to patient. She was admitted for abdominal pain in [place]  due to diverticulitis with evidence of several peritoneal masses . Biopsy non-diagnostic. She is flying back tomorrow.  Already had pain meds from local MD. She stated Chest CT and EKG unremar

## 2018-03-21 NOTE — PROGRESS NOTES
HPI:    Patient ID: Radha Barnett is a 80year old female. HPI     Patient  Iwas admitted to [facility]  in [facility] on 3/6/2018 for abdominal pain.   CT of abdomen and pelvis revealed acute diverticulitis of sigmoid colon as wel alpha 2  Suggestive of acute inflammation  With no monoclonal gammopathy  Lymph node     Biopsy FAVORS METASTATIC CARCINOMA  ( The case will be sent to [facility] for a second opinion and immunohistochemical markers.      Discharge meds :  Ferrous sulfa wheezes. She has no rales. Abdominal: Soft. Bowel sounds are normal.   Mild left lower quadrant tenderness. No guarding. No mass palpable,  BS present. Musculoskeletal: She exhibits no edema. Mild right gluteal tenderness, Straight leg negative.  No

## 2018-03-26 NOTE — TELEPHONE ENCOUNTER
Dr. Prudence Vega    Discussed with Elier Sierra to add pt to schedule. Pt contacted and she accepted appt with Dr. Craig Palma on 3/29/18 @ 3pm, arrival of 2:45PM, directions provided to the [place] [facility]. Pt added to provider schedule.

## 2018-03-26 NOTE — TELEPHONE ENCOUNTER
Incoming call received directly from Dr. Mima Brothers requesting Dr. Coronado Postal to see this patient asap for hx of diverticulitis and possible colon cancer. No appts available this week or next, please advise if and when we may add pt to your schedule, thank you.

## 2018-03-27 PROBLEM — R19.00 PELVIC MASS: Status: ACTIVE | Noted: 2018-01-01

## 2018-03-27 PROBLEM — K57.20 DIVERTICULITIS OF LARGE INTESTINE WITH ABSCESS: Status: ACTIVE | Noted: 2018-01-01

## 2018-03-27 PROBLEM — D50.9 IRON DEFICIENCY ANEMIA: Status: ACTIVE | Noted: 2018-01-01

## 2018-03-27 NOTE — PROGRESS NOTES
HPI:    Patient ID: Raleigh Benjamin is a 80year old female. HPI     Patient is here for f/u of diverticulitis of abscess and pelvic mass.  She presented with severe abdominal pain on 3/6/2018 while in [place], [place] and found to  Have  diverticul Disp:  Rfl:      Allergies:Not on File   PHYSICAL EXAM:   Physical Exam   Constitutional: She is oriented to person, place, and time. She appears well-developed. No distress. HENT:   Head: Normocephalic.    Mouth/Throat: Oropharynx is clear and moist.   E 27.0 - 32.0 pg 31.0   MCHC      32.0 - 37.0 g/dl 33.2   RDW      11.0 - 15.0 % 14.8   Platelet Count      877 - 400 K/   MEAN PLATELET VOLUME      7.4 - 10.3 fL 8.6   Neutrophils %      % 56   Lymphocytes %      % 19   Monocytes %      % 24   Eosinop

## 2018-03-28 NOTE — TELEPHONE ENCOUNTER
GI Clinical Staff:   Due to a scheduling conflict, I cannot see the patient at 3pm tomorrow. However, I can see the patient at 2pm. Could you please call patient in the morning to see if she can come an hour earlier?  Thanks

## 2018-03-29 NOTE — TELEPHONE ENCOUNTER
Scheduled for:  Colonoscopy [facility]  Provider Name: Dr Seb Minor  Date:  Juan Muñiz 4/10/18  Location:  [facility]  Sedation:  MAC rere King  Time:  9:30 am  Prep:split dose miralax/gatorade  Meds/Allergies Reconciled?:  PCN  Diagnosis with codes: sigmoid divertic

## 2018-03-29 NOTE — H&P
[address] [facility] [address] Gastroenterology                                                                                                  Clinic History and Physical     Pa rectal bleeding.     Prior endoscopies:  C_scope done few years ago per patient- she believes it was normal but the GI told her that he could not reach colon due to tortuous colon    Soc:  No smoking  No etoh    History, Medications, Allergies, ROS:      Pa height 5' 5\" (1.651 m), weight 169 lb 12.8 oz (77 kg), not currently breastfeeding.     Gen- Patient appears comfortable and in no acute discomfort  HEENT: the sclera appears anicteric, oropharynx clear, mucus membranes appear moist  CV- regular rate and r and alternatives to colonoscopy and EGD with the patient [who demonstrated understanding], including but not limited to the risks of bleeding, infection, pain, death, as well as the risks of anesthesia and perforation all leading to prolonged hospitalizati

## 2018-03-29 NOTE — PROGRESS NOTES
Call placed to [facility] to schedule CT scan tomorrow. Instructed nothing to eat or drink 2 hours prior, to arrive at 5:30 to [facility] for 6:30 scan. Call placed to Dr Kalani Browning office for medical records from [place].   [facility] informed that once the

## 2018-03-29 NOTE — PATIENT INSTRUCTIONS
1. CAT scan [place] will help organize)     2. See Dr. Redd Perez    3. Schedule EGD/colonoscopy with MAC (diverticulitis, iron def anemia, abnormal imaging)    4. Miralax/gatorade prep    5.  Continue all medications for procedure, except HOLD iron for 5 days p

## 2018-03-29 NOTE — TELEPHONE ENCOUNTER
Pt contacted and reviewed Dr. Deuce Gayle message below, she verbalized understanding and accepted to reschedule her appt for today at [facility] contacted and pt was rescheduled:  Date Time Provider Elier Juárez   3/29/2018 2:00 PM Ramona Simms MD [facility]

## 2018-03-30 NOTE — PROGRESS NOTES
Received call back from patients daughter requesting change of appt, offered and confirmed Thursday April 12 at 8am.

## 2018-03-30 NOTE — PROGRESS NOTES
Medical records from [place] obtained from Dr Candida Wlash and reviewed with Dr Kelvin Chavez. Colonscopy scheduled for 4/10/18. Per Dr Kelvin Chavez, consult offered on Friday 4/13/18. Confirmed with patient.   Informed Tyrone Mckeon if colonoscopy is moved earlier, to please call us ba

## 2018-04-03 NOTE — TELEPHONE ENCOUNTER
CT A/P reviewed with patient-->findings consistent with metastatic disease, possibly primary from colon? She is scheduled for EGD/CLn next week Tuesday. She feels constipated, I asked her to start miralax BID. She has appt with Dr. Po Narayanan next week as well.

## 2018-04-10 NOTE — ANESTHESIA PREPROCEDURE EVALUATION
Anesthesia PreOp Note    HPI:     Jason Delacruz is a 80year old female who presents for preoperative consultation requested by: Grayson Almanza MD    Date of Surgery: 4/10/2018    Procedure(s):  COLONOSCOPY  ESOPHAGOGASTRODUODENOSCOPY (EGD)  In prescriptions on file. Penicillins                 Comment:swelling    History reviewed. No pertinent family history. Social History  Social History   Marital status:    Spouse name: N/A    Years of education: N/A  Number of children: N/A 2  Plan:   MAC  Informed Consent Plan and Risks Discussed With:  Patient  Discussed plan with:  CRNA      I have informed Jany Way  of the nature of the anesthetic plan, benefits, risks, major complications, and any alternative forms of anes

## 2018-04-10 NOTE — H&P
History & Physical Examination    Patient Name: Rosario Martinez  MRN: [id number]  [facility]: [id number]  YOB: 1932    Diagnosis: diverticulitis, ab pain, anemia, metastatic cancer      Prescriptions Prior to Admission:  Ferrous Sulfate 325 They understand and agree to proceed with plan of care. I have reviewed the History and Physical done within the last 30 days. Any changes noted above.     Brennan Murray, [address] [place] [address] - Gastroenterology  4/10/2018  9:35 AM

## 2018-04-10 NOTE — OR NURSING
Patient tolerated water and is feeling much better. Discharged home with instructions to call M.D. If pain returns or gets worse. Also instructed to eat small light meals today.

## 2018-04-10 NOTE — ANESTHESIA POSTPROCEDURE EVALUATION
Patient: Abel Calderon    Procedure Summary     Date:  04/10/18 Room / Location:  [address] [place] [address] ENDOSCOPY 01 / [address] [place] [address] ENDOSCOPY    Anesthesia Start:  6486 Anesthesia Stop:      Procedures:       COLONOSCOPY (N/A )      ESOPHAGOGASTRODUODENOSCOPY (EGD) (N/A )

## 2018-04-10 NOTE — OPERATIVE REPORT
ESOPHAGOGASTRODUODENOSCOPY (EGD) & COLONOSCOPY REPORT    Jason Delacruz     1932 Age 80year old   PCP Marleny Sr MD Endoscopist Renae Del Angel MD     Date of procedure: 04/10/18    Procedure: EGD & Colonoscopy     Pre-operative gastric mucosa appeared normal. Retroflexion revealed a normal fundus and a non-patulous cardia. 3. Duodenum: The duodenal mucosa appeared normal in the 1st and 2nd portion of the duodenum. Colonoscopy findings:    1.  Severely tortuous colon with sha

## 2018-04-11 NOTE — OR NURSING
Called patient to follow up after c/e yesterday. Patient having uncontrollable abdominal pain that was present prior to procedure but has been worse since. Pt unable to sleep. Passing minimal flatus and 2 soft small bowel movements.  Pain partially relieved

## 2018-04-12 PROBLEM — C79.9 METASTATIC CARCINOMA (HCC): Status: ACTIVE | Noted: 2018-01-01

## 2018-04-12 PROBLEM — R18.0 ASCITES, MALIGNANT: Status: ACTIVE | Noted: 2018-01-01

## 2018-04-12 PROBLEM — R10.9 ABDOMINAL PAIN, ACUTE: Status: ACTIVE | Noted: 2018-01-01

## 2018-04-12 NOTE — CONSULTS
IP consult to Oncology Once  Consult performed by: Hayden Vega ordered by: Brenda Hermosillo  Reason for consult: metastatic carcinoma of unknown primary          [facility]    Report of Hematology/Oncology Consultation    Erasto The patient then left for [place].   Patient's daughter reports that she is very active senior, she is involved in a lot of senior activities in [place] goes on tours, etc. The patient states that she recalls in December around Fredy time, she developed review, was just for a flow cytometry which showed rare viable T cells with low viability and hypocellular specimen suboptimal for lymphoma detection.   Further tissue for immunohistochemical stains from [facility] was not available in the records which I r The patient was then evaluated by Dr. Mari Tilley. He ordered a CT scan of the chest abdomen and pelvis, as well as scheduled an EGD and colonoscopy. The CT scan of the chest abdomen pelvis was performed on 3/30/2018.   This revealed multiple large mass lesions The patient had EGD and colonoscopy in 4/10/2018. The EGD showed a 4 cm hiatal hernia. There is no other pathology seen in the esophagus, stomach or duodenum.   The colonoscopy was not able to be completed the colon was severely tortuous with sharp angula The patient was supposed to have an appointment with me for initial consultation this morning, she developed severe abdominal pain  However for approximately 12 hours. The pain was worse with any movement or with coughing.   The patient had not had any bow heterogenous mass measuring 5.9 x 6.7 cm which is seen posterior to the left ovary. The bones in the field did not appear to have any acute appearing fracture or suspicious osseous lesion. The lung base showed a right greater than left pleural effusion. latanoprost 0.005 % Ophthalmic Solution Place 1 drop into both eyes nightly.    Disp:  Rfl:      • latanoprost  1 drop Both Eyes Nightly   • [START ON 4/13/2018] Levothyroxine Sodium  88 mcg Oral Before breakfast         Family History   Problem Relation Ag Pulmonary/Chest: Breath sounds normal. She has no wheezes. She has no rales. Abdominal: She exhibits distension and mass (on the LLQ). There is hepatosplenomegaly (hepatomegaly with masses). Musculoskeletal: She exhibits no tenderness.    Lymphadenopath  140 - 400 K/UL   MPV 8.4 7.4 - 10.3 fL   Neutrophil % 81 %   Lymphocyte % 1 %   Monocyte % 13 %   Eosinophil % 0 %   Basophil % 0 %   Band % 4 0-10 % %   Metamyelocyte % 1 %   Neutrophil Absolute 12.5 (H) 1.8 - 7.7 K/UL   Lymphocyte Absolute 0.2 (L Discussed that this cancer is metastatic, or stage IV. Discussed that the goal of treatment for patients with metastatic cancer is palliative.   Discussed that patients have a good performance status, patient suspending over 50% of the time active and ambu

## 2018-04-12 NOTE — ED INITIAL ASSESSMENT (HPI)
c/o L sided abdominal pain s/p colonoscopy per Dr Rachel Shepard on 04/10. No BM since procedure. Denies n/v. Appt with Dr Filiberto Romo this morning for further eval of \"mass\" in colon found on CT in [place].

## 2018-04-12 NOTE — ED PROVIDER NOTES
Patient Seen in: [facility] AND [facility] Emergency Department    History   Patient presents with:  Abdominal Pain    Stated Complaint:     HPI    The patient is an 80-year-old female recently diagnosed with intra-abdominal masses while in [place].   2 days ago None (Room air)    Current:/58   Pulse 84   Temp 98.6 °F (37 °C) (Oral)   Resp 14   Ht 167.6 cm (5' 6\")   Wt 77.1 kg   SpO2 96%   BMI 27.44 kg/m²         Physical Exam   Constitutional: She is oriented to person, place, and time.  She appears well-de orders were created for panel order CBC WITH DIFFERENTIAL WITH PLATELET.   Procedure                               Abnormality         Status                     ---------                               -----------         ------                     CBC W/ D and patient will be admitted for further management and IV pain control and further workup of her disease.     Admission disposition: 4/12/2018 11:08 AM           Disposition and Plan     Clinical Impression:  Abdominal pain, acute  (primary encounter diagn

## 2018-04-13 NOTE — H&P
[facility] [place] Patient Status:  Inpatient    1932 MRN [id number]   Location [facility] 4W/SW/SE Attending Co, Arcelia Hampton MD   Hosp Day # 1 PCP Triny Gorman MD     Admitting Diagnos distended  Extremities: Warm, dry, no LE edema bilat  Pulses: 2+ bilat DP    Results:   Labs:  Recent Labs   Lab  04/12/18   0801  04/13/18   0508   RBC  3.18*  2.82*   HGB  9.4*  8.5*   HCT  28.4*  25.2*   MCV  89.4  89.5   MCH  29.6  30.0   MCHC  33.1  3

## 2018-04-13 NOTE — PLAN OF CARE
Patient/Family Goals    • Patient/Family Long Term Goal Progressing    • Patient/Family Short Term Goal Progressing          CT guided biopsy done today. Pt tolerated well. Dilaudid used for pain control. Tolerating full liquid diet.  Pt requesting diet be

## 2018-04-13 NOTE — PROGRESS NOTES
[facility]    Progress Note    [address] Patient Status:  Inpatient    1932 MRN [id number]   Location [facility] 4W/SW/SE Attending Co, Sherrie Sofia MD   Hosp Day # 1 PCP No Leyva MD       SUBJECTIVE:  No B (DILAUDID) 1 MG/ML injection 0.5 mg 0.5 mg Intravenous Q6H PRN   latanoprost (XALATAN) 0.005 % ophthalmic solution 1 drop 1 drop Both Eyes Nightly   Levothyroxine Sodium (SYNTHROID, LEVOTHROID) tab 88 mcg 88 mcg Oral Before breakfast   dextrose 5 %-0.45 %

## 2018-04-13 NOTE — TELEPHONE ENCOUNTER
After hours message received from Dr Emiliano Mitchell new consult in room 101. Also received after hours message from Media Oz [phone] her daughter to inform Dr Stacey Leach her mom is in the hospital and wondering if Dr Stacey Leach would be up to see her.

## 2018-04-13 NOTE — PROCEDURES
[facility] HOSP - [facility]  Procedure Note    [address] Patient Status:  Inpatient    1932 MRN [id number]   Location [facility] Attending Co, Howard Winston MD   Hosp Day # 1 PCP Giselle Arzate MD     Procedure

## 2018-04-13 NOTE — H&P
[facility]    PATIENT'S NAME: Parker Wilkinsonicho   ATTENDING PHYSICIAN: Neida Hirsch MD   PATIENT ACCOUNT#:   [id number]    LOCATION:  [address] [place] [address] RECORD #:   [id number]       YOB: 1932  ADMISSION DATE: pain, as per HPI, with a poor appetite. Reports no chest pain, no cough. Recent weight loss. PHYSICAL EXAMINATION:    GENERAL:  Chronically ill, but not in any form of distress.   VITAL SIGNS:  Blood pressure 131/49, temperature 99.2, pulse 92, respira MD  d: 04/12/2018 18:43:11  t: 04/12/2018 19:16:48  [facility] [id number]  [facility]/

## 2018-04-14 NOTE — PROGRESS NOTES
[facility] - [facility]    Progress Note    Jany Lewisio Patient Status:  Inpatient    1932 MRN [id number]   Location [facility] 4W/SW/SE Attending Co, Quni Garza MD   Hosp Day # 2 PCP Charan Schumacher MD       SUBJECTIVE:  Bora Ty Daily   latanoprost (XALATAN) 0.005 % ophthalmic solution 1 drop 1 drop Both Eyes Nightly   dextrose 5 %-0.45 % NaCl infusion  Intravenous Continuous         Assessment  Patient Active Problem List:     Acquired hypothyroidism     Glaucoma     CKD (chronic

## 2018-04-14 NOTE — PROGRESS NOTES
[facility]OSP - [facility]    Hematology/Oncology   Progress Note    [address] Patient Status:  Inpatient    1932 MRN [id number]   Location [facility] 4W/SW/SE Attending Co, Dionisio Lucio MD   Hosp Day # 2 PCP Preston Edwards MD free fluid. 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   Dictated by (CST): Harpreet Walker MD on 4/12/2018 at 10:04     Approved by (CST): Harpreet Walker MD on 4/12/2018 at 10:17          Ir Biopsy

## 2018-04-15 NOTE — PROGRESS NOTES
[facility]    Progress Note    [address] Patient Status:  Inpatient    1932 MRN [id number]   Location [facility] 4W/SW/SE Attending Co, Joao Ng MD   Hosp Day # 3 PCP Luci Magallon MD     SUBJECTIVE:   NGT i Medications:  ondansetron HCl (ZOFRAN) injection 4 mg 4 mg Intravenous Q6H PRN   OxyCODONE HCl ER (OXYCONTIN) 12 hr tab 10 mg 10 mg Oral Daily   dexamethasone (DECADRON) tab 4 mg 4 mg Oral 2 times per day   HYDROmorphone HCl (DILAUDID) 1 MG/ML injection 0.

## 2018-04-15 NOTE — PLAN OF CARE
GENITOURINARY - ADULT    • Absence of urinary retention Not Progressing          GASTROINTESTINAL - ADULT    • Minimal or absence of nausea and vomiting Progressing    • Maintains or returns to baseline bowel function Progressing        PAIN - ADULT    • V

## 2018-04-15 NOTE — PROGRESS NOTES
[facility] HOSP - [facility]    Hematology/Oncology   Progress Note    [address] Patient Status:  Inpatient    1932 MRN [id number]   Location [facility] 4W/SW/SE Attending Co, Virginia Pak MD   Hosp Day # 3 PCP Ken Bliss MD peritoneal carcinomatosis, liver mets. No flatus of 4 day and evidence of obstructive pattern on xray and clinical. NG placed however this accidentally came out.   --today now with flatus and less distention. Okay to leave out ng.   Unfortunately her canc

## 2018-04-15 NOTE — PLAN OF CARE
GASTROINTESTINAL - ADULT    • Minimal or absence of nausea and vomiting Progressing    • Maintains or returns to baseline bowel function Progressing        GENITOURINARY - ADULT    • Absence of urinary retention Progressing        PAIN - ADULT    • Brenda Cee

## 2018-04-16 NOTE — CM/SW NOTE
TD met with the pt. And her dtr. At bedside. The pt. Lives with her dtr. And her family in a 2 level home, but resides on the main level. There are 2 steps to enter. The pt. Reports being independent prior to admission with adls, ambulation and driving.

## 2018-04-16 NOTE — PHYSICAL THERAPY NOTE
PHYSICAL THERAPY EVALUATION - INPATIENT     Room Number: 495V/468-Z  Evaluation Date: 4/16/2018  Type of Evaluation: Initial   Physician Order: PT Eval and Treat    Presenting Problem: abdominal pain  Reason for Therapy: Mobility Dysfunction and Discharge [facility])    Coronary atherosclerosis    Liver metastasis ([facility] [address]    Abdominal distention    Intestinal obstruction (HCC)    Carcinomatosis ([facility] [address] [facility]      Past Medical History  Past Medical History:   Diagnosis Date   • Back pain    • Cataract    • Disorder of thyro have...  -   Turning over in bed (including adjusting bedclothes, sheets and blankets)?: A Little   -   Sitting down on and standing up from a chair with arms (e.g., wheelchair, bedside commode, etc.): A Little   -   Moving from lying on back to sitting on demonstrate independence with home activity/exercise instructions provided to patient in preparation for discharge.    Goal #5   Current Status    Goal #6    Goal #6  Current Status

## 2018-04-16 NOTE — PROGRESS NOTES
[facility] - [facility]    Progress Note    [address] Patient Status:  Inpatient    1932 MRN [id number]   Location [facility] 4W/SW/SE Attending Co, Howard Winston MD   Hosp Day # 4 PCP Giselle Arzate MD       SUBJECTIVE:  Carolina Garcia 4 mg 4 mg Oral 2 times per day   HYDROmorphone HCl (DILAUDID) 1 MG/ML injection 0.5 mg 0.5 mg Intravenous Q4H PRN   Levothyroxine Sodium (SYNTHROID) tab 100 mcg 100 mcg Oral Before breakfast   PEG 3350 (MIRALAX) powder packet 17 g 17 g Oral Daily   latanop

## 2018-04-16 NOTE — PLAN OF CARE
GASTROINTESTINAL - ADULT    • Minimal or absence of nausea and vomiting Progressing    • Maintains or returns to baseline bowel function Progressing        GENITOURINARY - ADULT    • Absence of urinary retention Progressing        PAIN - ADULT    • Ish Moura poor balance normal balance

## 2018-04-16 NOTE — PLAN OF CARE
GASTROINTESTINAL - ADULT    • Minimal or absence of nausea and vomiting Progressing    • Maintains or returns to baseline bowel function Progressing        GENITOURINARY - ADULT    • Absence of urinary retention Progressing        PAIN - ADULT    • Magda Ramirez

## 2018-04-16 NOTE — OCCUPATIONAL THERAPY NOTE
OCCUPATIONAL THERAPY EVALUATION - INPATIENT     Room Number: 631K/064-Y  Evaluation Date: 4/16/2018  Type of Evaluation: Initial  Presenting Problem: abdominal pain    Physician Order: IP Consult to Occupational Therapy  Reason for Therapy: ADL/IADL Dysfun malnutrition ([facility]    Coronary atherosclerosis    Liver metastasis (HCC)    Abdominal distention    Intestinal obstruction (HCC)    Carcinomatosis ([facility]    Past Medical History  Past Medical History:   Diagnosis Date   • Back pain    • Cataract    • Disord ADDITIONAL TESTS                                    NEUROLOGICAL FINDINGS                   ACTIVITIES OF DAILY LIVING ASSESSMENT  AM-PAC ‘6-Clicks’ Inpatient Daily Activity Short Form  How much help from another person does the patient currently need… 3x/week

## 2018-04-17 NOTE — CONSULTS
[facility]   Gastroenterology Consultation Note    [address]  Patient Status:    Inpatient  Date of Admission:         4/12/2018, Hospital day #5  Attending:   Alex Mary MD  PCP:     Nathan Zamora MD    Reason for Co 4/10/18--->I performed EGD/CLN--->found to have hiatal hernia, tortuous colon, incomplete colonoscopy (due to looping/extrinsic compression).          Soc:  No smoking  No etoh  History:  Past Medical History:   Diagnosis Date   • Back pain    • Cataract negative for diplopia   RESPIRATORY:  negative for severe shortness of breath  CARDIOVASCULAR:  negative for crushing sub-sternal chest pain  GASTROINTESTINAL:  see HPI  GENITOURINARY:  negative for dysuria or gross hematuria  INTEGUMENT/BREAST:  SKIN:  ne Cherry Garcia is a 80year old year-old female with history of hypothyroidism, sciatica, recent diagnosis of metastatic carcinoma (at OSH) who presents poor PO intake and abdominal pain/distention.  Recent EGD/CLN (incomplete 2/2 looping/extrinsi

## 2018-04-17 NOTE — PROGRESS NOTES
[facility]    Progress Note    Rose Lewisio Patient Status:  Inpatient    1932 MRN [id number]   Location [facility] 4W/SW/SE Attending Loretta Beaver MD   Hosp Day # 5 PCP Teresa Calvo MD       SUBJECTIVE:  Feel Oral Nightly PRN   ondansetron HCl (ZOFRAN) injection 4 mg 4 mg Intravenous Q6H PRN   OxyCODONE HCl ER (OXYCONTIN) 12 hr tab 10 mg 10 mg Oral Daily   dexamethasone (DECADRON) tab 4 mg 4 mg Oral 2 times per day   HYDROmorphone HCl (DILAUDID) 1 MG/ML injecti

## 2018-04-17 NOTE — CM/SW NOTE
MD order received regarding discharge planning for hospice. Referral [facility] made to [facility]. [facility] will follow up with the pt. And family regarding hospice at home.       Rl Davis [facility] ext [phone]

## 2018-04-17 NOTE — HOSPICE RN NOTE
Met with patient and her daughter along with her three grand daughters in the room to discuss Hospice and goals of care for this patient.   Patient was a volunteer for Hospice years ago and fully understands what Hospice is and she wants to go to her daught

## 2018-04-17 NOTE — DIETARY NOTE
Brief Nutrition Note:    Chart reviewed and noted NPO due to new advanced cancer with obstruction. Noted plans for venting g-tube and hospice eval due to poor prognosis. Nutrition assessment not warranted at this time.  Monitor for change in medical donnell

## 2018-04-17 NOTE — PROGRESS NOTES
[facility]    Progress Note    [address] Patient Status:  Inpatient    1932 MRN [id number]   Location [facility] 4W/SW/SE Attending Antonia Bobby MD   Hosp Day # 4 PCP Evelia Gale MD     Subjective:   Tenzin Alvarado obstruction. Discussed hospice care as being the best option of care for the patient. Hospice care, and hospice philosophy were discussed with the patient and daughter in detail. The patient and her daughter seem to be very accepting of this fact.   Sulema

## 2018-04-17 NOTE — HOSPICE RN NOTE
Pt signed consents for hospice. Discharge date TBD. Pt will be going home to daughter's home. Dr. Amanda Garber will be following pt. Marianela Smith RN on POC. Pt has scheduled PEG placement tomorrow. Will follow up with pt status tomorrow.

## 2018-04-17 NOTE — CONSULTS
INFECTIOUS DISEASE CONSULT NOTE    [address] Patient Status:  Inpatient    1932 MRN [id number]   Location [facility] 4W/SW/SE Attending Co, Dionisio Lucio MD   [facility] Day # 5 [facility] Location: [facility] ENDOSCOPY  No date: TONSILLECTOMY  Family History   Problem Relation Age of Onset   • Cancer Sister      LUNG CANCER      reports that she quit smoking about 60 years ago.  She has never used smokeless tobacco. She reports that she drinks alco hives, eczema or rhinitis. Physical Exam:  Vital signs: Blood pressure 129/61, pulse 79, temperature 97.7 °F (36.5 °C), temperature source Oral, resp.  rate 18, height 167.6 cm (5' 6\"), weight 163 lb 12.8 oz (74.3 kg), SpO2 93 %, not currently breastfee IR pelvic mass biopsy 4/13 wit pathology pending. Low grade fevers while here with increased leukocytosis especially after starting dexamethasone. UCx with S. Aureus. Also with night sweats, weatkness, fatigue, poor appetite, abdominal pain. No BMs.     # A

## 2018-04-17 NOTE — PLAN OF CARE
CHANGE IN BODY IMAGE    • Pt/Family communicate acceptance of loss or change in body image and feel psychological comfort and peace Progressing        COPING    • Pt/Family able to verbalize concerns and demonstrate effective coping strategies Progressing

## 2018-04-18 NOTE — ANESTHESIA PREPROCEDURE EVALUATION
Anesthesia PreOp Note    HPI:     Perri Fortune is a 80year old female who presents for preoperative consultation requested by: Luke López MD    Date of Surgery: 4/12/2018 - 4/18/2018    Procedure(s):  ESOPHAGOGASTRODUODENOSCOPY (EGD)  PER Ferrous Sulfate 325 (65 Fe) MG Oral Tab Take 325 mg by mouth daily with breakfast.   Disp:  Rfl:  3/29/2018   Levothyroxine Sodium 88 MCG Oral Tab Take 1 tablet (88 mcg total) by mouth before breakfast. Disp: 90 tablet Rfl: 3 4/12/2018 at [address] Years of education: N/A  Number of children: N/A     Occupational History  retired dress maker       Social History Main Topics   Smoking status: Former Smoker     Quit date: 8/9/1957    Smokeless tobacco: Never Used    Comment: teenage years    Alcohol u Pulmonary - negative ROS and normal exam   Cardiovascular - normal exam    Neuro/Psych    (+) neuromuscular disease,     GI/Hepatic/Renal    (+) liver disease,     Endo/Other    Abdominal  - normal exam             Anesthesia Plan:   ASA:  4  Plan:   MAC

## 2018-04-18 NOTE — INTERVAL H&P NOTE
Pre-op Diagnosis: Abdominal distension,Metastatic cancer     The above referenced H&P was reviewed by Santino Hernandez MD on 4/18/2018, the patient was examined and no significant changes have occurred in the patient's condition since the H&P was performed.

## 2018-04-18 NOTE — ANESTHESIA POSTPROCEDURE EVALUATION
Patient: Patty Gutierrez    Procedure Summary     Date:  04/18/18 Room / Location:  [address] [place] [place] [address] ENDOSCOPY 01 / [address] [place] [place] [address] ENDOSCOPY    Anesthesia Start:  8478 Anesthesia Stop:  7402    Procedures:       ESOPHAGOGASTRODUODENOSCOPY (EGD) (N/A )      PERCUTANEOUS EN

## 2018-04-18 NOTE — PHYSICAL THERAPY NOTE
Followed up with patient in PM as requested to initiate PT treatment. Patient reporting persistent abdominal discomfort, refusing PT despite encouragement.  Educated patient to transfer to bedside chair with RN this evening to increase activity tolerance, p

## 2018-04-18 NOTE — H&P (VIEW-ONLY)
[facility]   Gastroenterology Consultation Note    [address]  Patient Status:    Inpatient  Date of Admission:         4/12/2018, Hospital day #5  Attending:   Iwona Diaz MD  PCP:     No Leyva MD    Reason for Co 4/10/18--->I performed EGD/CLN--->found to have hiatal hernia, tortuous colon, incomplete colonoscopy (due to looping/extrinsic compression).          Soc:  No smoking  No etoh  History:  Past Medical History:   Diagnosis Date   • Back pain    • Cataract negative for diplopia   RESPIRATORY:  negative for severe shortness of breath  CARDIOVASCULAR:  negative for crushing sub-sternal chest pain  GASTROINTESTINAL:  see HPI  GENITOURINARY:  negative for dysuria or gross hematuria  INTEGUMENT/BREAST:  SKIN:  ne Cherry White is a 80year old year-old female with history of hypothyroidism, sciatica, recent diagnosis of metastatic carcinoma (at OSH) who presents poor PO intake and abdominal pain/distention.  Recent EGD/CLN (incomplete 2/2 looping/extrinsi

## 2018-04-18 NOTE — PROGRESS NOTES
INFECTIOUS DISEASE PROGRESS NOTE    [address] Patient Status:  Inpatient    1932 MRN [id number]   Location [facility] 4W/SW/SE Attending Co, Yue Mon MD   Hosp Day # 5 PCP Ginny Carmona MD     Subjective:  Afebrile.  Abdominal s/p Ertapenem then transition to PO Levo/flagyl. US guided biopsy inconclusive results concerning for metastatic carcinoma.  Underwent EGD/colonoscopy on 4/10/18 with incomplete c-scope as unable to advance scope beyond distal transverse colon concerning fo

## 2018-04-18 NOTE — OPERATIVE REPORT
ESOPHAGOGASTRODUODENOSCOPY (EGD) WITH PERCUTANEOUS ENDOSCOPIC GASTROSTOMY PLACEMENT (PEG)    Levi Sanchez     1932 Age 80year old   PCP Preston Edwards MD Endoscopist Candice Matthew MD     Date of procedure: 18    Procedure: passed through the cannula, was caught by the snare passed through the endoscope and brought out through the mouth. A PEG tube was inserted over the guidewire and advanced through the abdominal wall.  The PEG tube was noted at 2.5 cm at the skin with the tu

## 2018-04-18 NOTE — PROGRESS NOTES
[facility]    Progress Note    [address] Patient Status:  Inpatient    1932 MRN [id number]   Location [facility] 4W/SW/SE Attending Co, Marika Alejandre MD   Hosp Day # 6 PCP Dmitry Fernandez MD       SUBJECTIVE:  Debo Grady drop Both Eyes Nightly   dextrose 5 %-0.45 % NaCl infusion  Intravenous Continuous         Assessment  Patient Active Problem List:     Acquired hypothyroidism     Glaucoma     CKD (chronic kidney disease) stage 3, GFR 30-59 ml/min     Overweight (BMI 25. 0

## 2018-04-18 NOTE — OCCUPATIONAL THERAPY NOTE
Attempted to see Pt for OT treatment, Pt stated that she was tired from EGD procedure earlier today and was not up to participating in therapy at this time. Will reschedule for tomorrow.

## 2018-04-18 NOTE — PHYSICAL THERAPY NOTE
Chart reviewed, patient s/p endoscopy with G-tube placement. RN provided consent to see patient if agreeable. Per discussion with patient, she feels nauseous and uncomfortable after the procedure.  Patient refused to participate in therapy at this time desp

## 2018-04-19 NOTE — PROGRESS NOTES
[facility]  Progress Note    [address] Lewisio Patient Status:  Inpatient    1932 MRN [id number]   Location [facility] 4W/SW/SE Attending Kari Wylie MD   Hosp Day # 7 PCP Bryanna Quintero MD     Subjective:  Shree Peres insertion. Patient will be educated regarding use of the PEG tube. I did point out some features of the PEG tube. I have advised that she keep this bandaged are covered so that he does not pull on her clothing and become dislodged.     To call with any

## 2018-04-19 NOTE — PHYSICAL THERAPY NOTE
PHYSICAL THERAPY TREATMENT NOTE - INPATIENT     Room Number: 481N/755-G       Presenting Problem: abdominal pain    Problem List  Principal Problem:    Abdominal pain, acute  Active Problems:    Metastatic carcinoma (HCC)    Ascites, malignant    Moderate place, family present, RN aware. Patient achieved family training goals for safe d/c home, to d/c from acute PT at this time.  PT recommendation home with 24 hr care/assist.    DISCHARGE RECOMMENDATIONS  PT Discharge Recommendations: 24 hour care/supervisio (AM-PAC Scale): 43.63   CMS Modifier (G-Code): CK    FUNCTIONAL ABILITY STATUS  Gait Assessment   Gait Assistance: Other (Comment) (SBA)  Distance (ft): 100' x 2  Assistive Device: Rolling walker  Pattern: Shuffle (flexed posture)  Stoop/Curb Assistance: O

## 2018-04-19 NOTE — CM/SW NOTE
MD order received regarding discharge planning for hospice. Referral has been made to [facility] and the pt. Has signed consents for home at home when she is discharged. [facility] is following and will follow up with the pt.  And family

## 2018-04-19 NOTE — HOSPICE RN NOTE
Pt to dc home via med car at 230. Baljinder Martínez to order. Updated RN and called Dr Eubanks [place] office as well. DME and comfort meds ordered. Daughter and pt updated on plan as well.

## 2018-04-19 NOTE — PLAN OF CARE
CHANGE IN BODY IMAGE    • Pt/Family communicate acceptance of loss or change in body image and feel psychological comfort and peace Adequate for Discharge        COPING    • Pt/Family able to verbalize concerns and demonstrate effective coping strategies A

## 2018-04-19 NOTE — DISCHARGE SUMMARY
[facility]    Discharge Summary    [address] Patient Status:  Inpatient    1932 MRN [id number]   Location [facility] 4W/SW/SE Attending No att. providers found   Hosp Day # 7 PCP Catalina Montilla MD     Date of A abdominal pain, poor appetite, and constipation. She was hospitalized on March 16, 2018, at [place], [place], for acute abdominal pain and found to have acute diverticulitis of sigmoid colon with left pelvic abscess and pelvic masses.   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 Aggressive nature of this type of cancer  And poor prognosis was discussed with patient and daughter.  Given her poor performance, malu Fe) MG Tabs              Where to Get Your Medications      These medications were sent to [address], [address] [phone], [address] [place] [phone]    Phon

## 2018-05-11 NOTE — TELEPHONE ENCOUNTER
Received fax from Suraj at [facility] that Dank Gavin passed away this morning at home at 4:53am. [email or web address]

## 2021-07-16 NOTE — PLAN OF CARE
Patient/Family Goals    • Patient/Family Long Term Goal Progressing    • Patient/Family Short Term Goal Progressing        Pain control with dilaudid and oxycontin, zofran for nausea, archuleta inserted, urine culture pending, up to chair, used lift back to be none

## (undated) DEVICE — ENDOSCOPY PACK UPPER: Brand: MEDLINE INDUSTRIES, INC.

## (undated) DEVICE — ENDOSCOPY PACK - LOWER: Brand: MEDLINE INDUSTRIES, INC.

## (undated) DEVICE — Device: Brand: DEFENDO AIR/WATER/SUCTION AND BIOPSY VALVE

## (undated) NOTE — LETTER
[address], [place]  Authorization for Invasive Procedures  1.  I hereby authorize Candida Dee ,my physician and whomever may be designated as the doctor's assistant, to perform the following operation and/or procedure:  Colonoscopy performed for the purposes of advancing medicine, science, and/or education, provided my identity is not revealed. If the procedure has been videotaped, the physician/surgeon will obtain the original videotape.  The hospital will not be responsible for stor My signature below affirms that prior to the time of the procedure, I have explained to the patient and/or her legal representative, the risks and benefits involved in the proposed treatment and any reasonable alternative to the proposed treatment.  I have

## (undated) NOTE — LETTER
[facility]  Administration of Anesthesia  1.  Gaby Gtz, or _________________________________ acting on her behalf, (Patient) (Dependent/Representative) request to receive anesthesia for my pending procedure/operation/treat infections, high spinal block, spinal bleeding, seizure, cardiac arrest and death. 7. AWARENESS: I understand that it is possible (but unlikely) to have explicit memory of events from the operating room while under general anesthesia.   8. ELECTROCONVULSIV unconscious pt /Relationship    My signature below affirms that prior to the time of the procedure, I have explained to the patient and/or his/her guardian, the risks and benefits of undergoing anesthesia, as well as any reasonable alternatives.     _______

## (undated) NOTE — LETTER
[facility], [place]  Authorization for Invasive Procedures  1.  I hereby authorize  Dr. Axel Winters, my physician and whomever may be designated as the doctor's assistant, to perform the following operation and/or procedure:  Carin Gottron performed for the purposes of advancing medicine, science, and/or education, provided my identity is not revealed. If the procedure has been videotaped, the physician/surgeon will obtain the original videotape.  The hospital will not be responsible for stor My signature below affirms that prior to the time of the procedure, I have explained to the patient and/or her legal representative, the risks and benefits involved in the proposed treatment and any reasonable alternative to the proposed treatment.  I have